# Patient Record
(demographics unavailable — no encounter records)

---

## 2024-10-08 NOTE — HEALTH RISK ASSESSMENT
[No] : No [No falls in past year] : Patient reported no falls in the past year [0] : 2) Feeling down, depressed, or hopeless: Not at all (0) [PHQ-2 Negative - No further assessment needed] : PHQ-2 Negative - No further assessment needed [Never] : Never [[id number]] : 0

## 2024-10-08 NOTE — HISTORY OF PRESENT ILLNESS
[FreeTextEntry1] : follow up visit numbness and tingling. [de-identified] : Over the summer pt was working in the garden and was up on a ladder and noticed his balance was off. He had some neuropathy in his legs and arms as well. He is unsure what made him feel off balance. He felt tingling in the arms and legs. He saw ortho in [place]. He was diagnosed with Carpal Tunnel in both hands. He was given exercises to do. He went to PT for the exercises, and they helped with his mobility.  He is unsure if looking up while on the ladder triggered the tingling in his arms.  May have been positional, what he felt in the legs and arms. When he took a break from gardening these symptoms improved.  He has also had reactions every time he has had a COVID booster, more of a reaction with the Pfizer than Moderna. Therefore unsure about getting another booster.

## 2024-10-08 NOTE — PHYSICAL EXAM
[No JVD] : no jugular venous distention [Normal] : normal rate, regular rhythm, normal S1 and S2 and no murmur heard [Coordination Grossly Intact] : coordination grossly intact [No Focal Deficits] : no focal deficits [Normal Gait] : normal gait [Normal Affect] : the affect was normal [Normal Insight/Judgement] : insight and judgment were intact

## 2024-10-16 NOTE — PLAN
[FreeTextEntry1] : This is an 82-year-old male who incidentally I removed his wife's appendix and it turned out she had a mucinous cystadenocarcinoma and was subsequently treated at [facility].  The patient is here today because he had what he thought was a boil on the back of his left buttock after working in the garden they got really large and then shrunk down in size.  He denied having any drainage.  He is here to be seen if it has to be removed or drained.  He denies fevers, night sweats, or weight loss.  He is on no blood thinning medications with exception of aspirin  He has no known drug allergies He has history of prostate cancer.  Past surgical history includes prostate surgery, excision of the melanoma, hernia repair, and orthopedic procedures.  Social history is negative for tobacco  On limited exam of his left buttock he has an area of approximately 2 cm of induration with a central portion of necrosis.  There is no purulent drainage.  It is nontender.  Plan: I suspect the patient had either an insect bite on his buttock while working in the garden.  Is difficult to fully evaluate what has remaining chronic inflammation.  I would like him return in 3 weeks time for reevaluation.  I will rule out that this is a sebaceous cyst which I think is unlikely based on the current exam.  Also want a rule out that this is a metastatic implant.  Assuming his skin normalizes there is nothing else to do.  However if it still appears to be abnormal may consider an excisional biopsy.  Patient will return in 3 weeks time.  Between taking a history, examining the patient, and doing charting more than 30 minutes of time was spent.

## 2024-11-08 NOTE — HISTORY OF PRESENT ILLNESS
[TextBox_4] : 82-year-old male with known moderate to severe COPD.  He is maintained on Trelegy.  He was in his usual state of health when last night after eating a pastry he developed a coughing bout.  This persisted.  He was able to go back to sleep and then this morning he developed chills and fever to 101.  He is still coughing with clear sputum.  He feels slightly more short of breath than baseline.  There are no sick contacts.  He denies urinary symptoms.  His current temperature is 101.8.

## 2024-11-08 NOTE — ASSESSMENT
[FreeTextEntry1] : Patient with a fever of 101.8 and a cough.  Chest exam reveals diminished breath sounds.  Current O2 sat 95.  Patient is nontoxic.  Patient will be referred for an urgent chest x-ray and I will start patient empirically on Augmentin.  I will call patient with results of x-ray tomorrow.

## 2025-01-08 NOTE — PLAN
[FreeTextEntry1] : Patient is more than 2 months status post last visit where he had a presumed infected sebaceous cyst on his left buttock.  It was difficult to tell as it was very inflamed.  He has been successfully conservatively managed and is here for follow-up.  He has no fevers.  On exam there is a small area of fluctuance at the original location and unable to express what appears to be sebaceous material.  It is approximately 3 cm in diameter and irregular in shape.  It is difficult to see the puncta but there clearly is 1.  It is not fixed to the underlying fascia.  Plan: Patient has a previously infected sebaceous cyst.  He wishes to have it removed.  Will schedule for excision in the minor op procedure room under straight local anesthesia.  He can continue his aspirin.  He can arrive on a full stomach.  Risk of bleeding, infection, numbness and recurrence have been explained to the patient and he is agreeable to the procedure.  30 minutes of time was spent taking a history, examining the patient.  Scheduling the surgery.  Discussing risk and benefits and charting.

## 2025-04-22 NOTE — ASSESSMENT
[FreeTextEntry1] : Patient with moderate to severe COPD currently off bronchodilators.  His cough over the past 1 week will be observed.  Will obtain spirometry to evaluate where his lungs  stand and consider restarting Trelegy.  Will discuss with the patient after spirometry results are available.

## 2025-04-22 NOTE — HISTORY OF PRESENT ILLNESS
[TextBox_4] : 82-year-old man with known moderate to severe COPD due to prior smoking history.  He stopped Trelegy in November when he had COVID.  Over the past week or so he has been coughing with clear sputum.  He has occasional slight wheezing in the morning.  He has not been able to exercise much but gets short of breath carrying objects.  He did to walk for up to a half a mile recently and was okay.  He has slight rhinorrhea and feels his cough may be due to allergies.  He has no fever, chills or generalized weakness.  He comes in today for cough over the past 1 week.  He would like to know if he should restart Trelegy.  Current spirometry will be done.

## 2025-05-06 NOTE — HISTORY OF PRESENT ILLNESS
[PMH Reviewed and Updated] : past medical history reviewed and updated [0] : 0 [Retired] : retired from work [None] : The patient has no concerns about alcohol abuse [Never] : has never used illicit drugs [Compliant with medications] : compliant with medications [Fully Independent] : fully independent [Drives without concerns] : drives without concerns [No history of falls] : no history of falls [PSH Reviewed and Updated] : past surgical history reviewed and updated [Family History Reviewed and Updated] : family history reviewed and updated [Medication and Allergies Reconciled] : medication and allergies reconciled [Spouse] : spouse [Friends] : friends [Extended Family] : extended family [Seatbelts] : seatbelts [Safe Driving Habits] : safe driving habits [Smoke Detectors] : smoke detectors [Carbon Monoxide Detector] : carbon monoxide detector [Hot Water Temp <120F] : hot water temp <120F [Bathroom Grab Bars] : bathroom grab bars [Sunscreen] : sunscreen [Patient Has Full Capacity] : this patient has full decision making capacity for discussion of advance care planning [Patient Has Designated Health Care Proxy/Advanced Directive] : patient has designated Health Care Proxy/Advanced Directive [FreeTextEntry1] : medicare annual wellness [Over the Past 2 Weeks, Have You Felt Down, Depressed, or Hopeless?] : 1.) Over the past 2 weeks, have you felt down, depressed, or hopeless? No [Over the Past 2 Weeks, Have You Felt Little Interest or Pleasure Doing Things?] : 2.) Over the past 2 weeks, have you felt little interest or pleasure doing things? No [Bicycle Helmet] : not using bicycle helmet [Motorcycle Helmet] : not using motorcycle helmet [Fall Prevention Measures] : not using fall prevention measures [Gun Trigger Locks] : not using gun trigger locks [CPR Training for Household] : did not receive CPR training for patient [CPR Training for Patient] : did not receive CPR training for patient [FreeTextEntry2] : none [de-identified] : none [de-identified] : none [de-identified] : Pt had COVID in November. Had symptoms for about 3 weeks. He lost some weight with COVID. Had some issues with cough afterward and saw Dr Preston.  He was given trelegy but stopped it as he felt that it was not helping. He had another visit there and had PFT's showing severe obstructive disease, and now is back on the medication.  Still has neuropathy in the legs. It was worse overall as he was not exercising. Now back to walking and feels a bit better. Pt has had balance issues  and numbness in LE. He had MRI LS spine showing central canal stenosis and b/l foraminal stenosis of L4-L5. He is following with ortho spine and was

## 2025-05-06 NOTE — HEALTH RISK ASSESSMENT
[Very Good] : ~his/her~  mood as very good [No] : In the past 12 months have you used drugs other than those required for medical reasons? No [No falls in past year] : Patient reported no falls in the past year [0] : 2) Feeling down, depressed, or hopeless: Not at all (0) [PHQ-2 Negative - No further assessment needed] : PHQ-2 Negative - No further assessment needed [Yes] : Yes [Monthly or less (1 pt)] : Monthly or less (1 point) [1 or 2 (0 pts)] : 1 or 2 (0 points) [Never (0 pts)] : Never (0 points) [Former] : Former [> 15 Years] : > 15 Years [With Family] : lives with family [On disability] : on disability [] :  [Fully functional (bathing, dressing, toileting, transferring, walking, feeding)] : Fully functional (bathing, dressing, toileting, transferring, walking, feeding) [Fully functional (using the telephone, shopping, preparing meals, housekeeping, doing laundry, using] : Fully functional and needs no help or supervision to perform IADLs (using the telephone, shopping, preparing meals, housekeeping, doing laundry, using transportation, managing medications and managing finances) [Patient reported colonoscopy was normal] : Patient reported colonoscopy was normal [HIV test declined] : HIV test declined [Hepatitis C test declined] : Hepatitis C test declined [Alone] : lives alone [Retired] : retired [# Of Children ___] : has [unfilled] children [Feels Safe at Home] : Feels safe at home [Audit-CScore] : 1 [[id number]] : 0 [Reports changes in hearing] : Reports no changes in hearing [Reports changes in vision] : Reports no changes in vision [Reports changes in dental health] : Reports no changes in dental health [ColonoscopyComments] : has had colonoscopy in the past.  Dr Weir [FreeTextEntry2] : [employer] [employer] [FreeTextEntry3] : [place] and [place]

## 2025-05-06 NOTE — HISTORY OF PRESENT ILLNESS
[PMH Reviewed and Updated] : past medical history reviewed and updated [0] : 0 [Retired] : retired from work [None] : The patient has no concerns about alcohol abuse [Never] : has never used illicit drugs [Compliant with medications] : compliant with medications [Fully Independent] : fully independent [Drives without concerns] : drives without concerns [No history of falls] : no history of falls [PSH Reviewed and Updated] : past surgical history reviewed and updated [Family History Reviewed and Updated] : family history reviewed and updated [Medication and Allergies Reconciled] : medication and allergies reconciled [Spouse] : spouse [Friends] : friends [Extended Family] : extended family [Seatbelts] : seatbelts [Safe Driving Habits] : safe driving habits [Smoke Detectors] : smoke detectors [Carbon Monoxide Detector] : carbon monoxide detector [Hot Water Temp <120F] : hot water temp <120F [Bathroom Grab Bars] : bathroom grab bars [Sunscreen] : sunscreen [Patient Has Full Capacity] : this patient has full decision making capacity for discussion of advance care planning [Patient Has Designated Health Care Proxy/Advanced Directive] : patient has designated Health Care Proxy/Advanced Directive [FreeTextEntry1] : medicare annual wellness [Over the Past 2 Weeks, Have You Felt Down, Depressed, or Hopeless?] : 1.) Over the past 2 weeks, have you felt down, depressed, or hopeless? No [Over the Past 2 Weeks, Have You Felt Little Interest or Pleasure Doing Things?] : 2.) Over the past 2 weeks, have you felt little interest or pleasure doing things? No [Bicycle Helmet] : not using bicycle helmet [Motorcycle Helmet] : not using motorcycle helmet [Fall Prevention Measures] : not using fall prevention measures [Gun Trigger Locks] : not using gun trigger locks [CPR Training for Household] : did not receive CPR training for patient [CPR Training for Patient] : did not receive CPR training for patient [FreeTextEntry2] : none [de-identified] : none [de-identified] : none [de-identified] : Pt had COVID in November. Had symptoms for about 3 weeks. He lost some weight with COVID. Had some issues with cough afterward and saw Dr Preston.  He was given trelegy but stopped it as he felt that it was not helping. He had another visit there and had PFT's showing severe obstructive disease, and now is back on the medication.  Still has neuropathy in the legs. It was worse overall as he was not exercising. Now back to walking and feels a bit better. Pt has had balance issues  and numbness in LE. He had MRI LS spine showing central canal stenosis and b/l foraminal stenosis of L4-L5. He is following with ortho spine and was

## 2025-05-06 NOTE — PHYSICAL EXAM
[No Acute Distress] : no acute distress [Well-Appearing] : well-appearing [Normal Sclera/Conjunctiva] : normal sclera/conjunctiva [Normal Outer Ear/Nose] : the outer ears and nose were normal in appearance [No JVD] : no jugular venous distention [No Lymphadenopathy] : no lymphadenopathy [Supple] : supple [Thyroid Normal, No Nodules] : the thyroid was normal and there were no nodules present [Normal] : normal rate, regular rhythm, normal S1 and S2 and no murmur heard [No Edema] : there was no peripheral edema [Soft] : abdomen soft [Non Tender] : non-tender [Non-distended] : non-distended [No Masses] : no abdominal mass palpated [Normal Bowel Sounds] : normal bowel sounds [No CVA Tenderness] : no CVA  tenderness [No Spinal Tenderness] : no spinal tenderness [No Joint Swelling] : no joint swelling [Grossly Normal Strength/Tone] : grossly normal strength/tone [No Rash] : no rash [Coordination Grossly Intact] : coordination grossly intact [No Focal Deficits] : no focal deficits [Normal Gait] : normal gait [Normal Affect] : the affect was normal [Normal Insight/Judgement] : insight and judgment were intact [de-identified] : able to ambulate without a cane [de-identified] : skin lesions, none suspicious, follows with Dr Serrano.

## 2025-05-06 NOTE — HEALTH RISK ASSESSMENT
[Very Good] : ~his/her~  mood as very good [No] : In the past 12 months have you used drugs other than those required for medical reasons? No [No falls in past year] : Patient reported no falls in the past year [0] : 2) Feeling down, depressed, or hopeless: Not at all (0) [PHQ-2 Negative - No further assessment needed] : PHQ-2 Negative - No further assessment needed [Yes] : Yes [Monthly or less (1 pt)] : Monthly or less (1 point) [1 or 2 (0 pts)] : 1 or 2 (0 points) [Never (0 pts)] : Never (0 points) [Former] : Former [> 15 Years] : > 15 Years [With Family] : lives with family [On disability] : on disability [] :  [Fully functional (bathing, dressing, toileting, transferring, walking, feeding)] : Fully functional (bathing, dressing, toileting, transferring, walking, feeding) [Fully functional (using the telephone, shopping, preparing meals, housekeeping, doing laundry, using] : Fully functional and needs no help or supervision to perform IADLs (using the telephone, shopping, preparing meals, housekeeping, doing laundry, using transportation, managing medications and managing finances) [Patient reported colonoscopy was normal] : Patient reported colonoscopy was normal [HIV test declined] : HIV test declined [Hepatitis C test declined] : Hepatitis C test declined [Alone] : lives alone [Retired] : retired [# Of Children ___] : has [unfilled] children [Feels Safe at Home] : Feels safe at home [Audit-CScore] : 1 [[id number]] : 0 [Reports changes in hearing] : Reports no changes in hearing [Reports changes in vision] : Reports no changes in vision [Reports changes in dental health] : Reports no changes in dental health [ColonoscopyComments] : has had colonoscopy in the past.  Dr Weir [FreeTextEntry2] : [employer] [employer] [FreeTextEntry3] : [place] and [place]

## 2025-05-06 NOTE — REVIEW OF SYSTEMS
[Joint Pain] : joint pain [Muscle Weakness] : muscle weakness [Negative] : Heme/Lymph [de-identified] : numbness in legs, often feels likes legs are not attached to his body

## 2025-05-06 NOTE — DATA REVIEWED
[FreeTextEntry1] : reviewed last labs,  Pt brought in MRI showing L4-L5 central canal stenosis and b/l foraminal stenosis.

## 2025-05-06 NOTE — REVIEW OF SYSTEMS
[Joint Pain] : joint pain [Muscle Weakness] : muscle weakness [Negative] : Heme/Lymph [de-identified] : numbness in legs, often feels likes legs are not attached to his body

## 2025-05-06 NOTE — PHYSICAL EXAM
[No Acute Distress] : no acute distress [Well-Appearing] : well-appearing [Normal Sclera/Conjunctiva] : normal sclera/conjunctiva [Normal Outer Ear/Nose] : the outer ears and nose were normal in appearance [No JVD] : no jugular venous distention [No Lymphadenopathy] : no lymphadenopathy [Supple] : supple [Thyroid Normal, No Nodules] : the thyroid was normal and there were no nodules present [Normal] : normal rate, regular rhythm, normal S1 and S2 and no murmur heard [No Edema] : there was no peripheral edema [Soft] : abdomen soft [Non Tender] : non-tender [Non-distended] : non-distended [No Masses] : no abdominal mass palpated [Normal Bowel Sounds] : normal bowel sounds [No CVA Tenderness] : no CVA  tenderness [No Spinal Tenderness] : no spinal tenderness [No Joint Swelling] : no joint swelling [Grossly Normal Strength/Tone] : grossly normal strength/tone [No Rash] : no rash [Coordination Grossly Intact] : coordination grossly intact [No Focal Deficits] : no focal deficits [Normal Gait] : normal gait [Normal Affect] : the affect was normal [Normal Insight/Judgement] : insight and judgment were intact [de-identified] : able to ambulate without a cane [de-identified] : skin lesions, none suspicious, follows with Dr Serrano.

## 2025-06-13 NOTE — HISTORY OF PRESENT ILLNESS
[TextBox_4] : Patient with known severe COPD on Trelegy with an FEV1 of 40% predicted.  He was doing well without any symptoms whatsoever until 5 days ago.  He feels he caught a cold with slight sore throat cough and purulent sputum.  He was feeling quite sick for the next 3 days although no fever.  He had persistent cough and purulent sputum.  I called the patient last night and started him on Augmentin.  He states he feels better today with decreasing cough and decreasing purulence of sputum.  There remains no fever.  He denies increasing shortness of breath or wheezing.  He feels he needs the antibiotic as he is feeling much better.

## 2025-06-13 NOTE — ASSESSMENT
[FreeTextEntry1] : Patient with an exacerbation of COPD likely due to an acute bronchitis.  I suspect this is viral in origin but will continue with the antibiotic.  The patient is now feeling somewhat better and he looks generally well.  Current O2 sat 95 on room air.  He will call me in a week and let me know how he is feeling.